# Patient Record
Sex: FEMALE | Race: WHITE | NOT HISPANIC OR LATINO | URBAN - METROPOLITAN AREA
[De-identification: names, ages, dates, MRNs, and addresses within clinical notes are randomized per-mention and may not be internally consistent; named-entity substitution may affect disease eponyms.]

---

## 2020-01-23 ENCOUNTER — OFFICE VISIT - HEALTHEAST (OUTPATIENT)
Dept: SURGERY | Facility: CLINIC | Age: 55
End: 2020-01-23

## 2020-01-23 DIAGNOSIS — K42.0 UMBILICAL HERNIA WITH OBSTRUCTION, WITHOUT GANGRENE: ICD-10-CM

## 2020-01-28 ENCOUNTER — SURGERY - HEALTHEAST (OUTPATIENT)
Dept: SURGERY | Facility: CLINIC | Age: 55
End: 2020-01-28

## 2020-01-28 ENCOUNTER — ANESTHESIA - HEALTHEAST (OUTPATIENT)
Dept: SURGERY | Facility: CLINIC | Age: 55
End: 2020-01-28

## 2020-01-28 ASSESSMENT — MIFFLIN-ST. JEOR: SCORE: 1379

## 2020-02-11 ENCOUNTER — COMMUNICATION - HEALTHEAST (OUTPATIENT)
Dept: SURGERY | Facility: CLINIC | Age: 55
End: 2020-02-11

## 2020-02-11 ENCOUNTER — OFFICE VISIT - HEALTHEAST (OUTPATIENT)
Dept: SURGERY | Facility: CLINIC | Age: 55
End: 2020-02-11

## 2020-02-11 DIAGNOSIS — Z48.89 POSTOPERATIVE VISIT: ICD-10-CM

## 2021-06-04 VITALS
DIASTOLIC BLOOD PRESSURE: 76 MMHG | OXYGEN SATURATION: 97 % | HEART RATE: 82 BPM | SYSTOLIC BLOOD PRESSURE: 122 MMHG | WEIGHT: 184 LBS | BODY MASS INDEX: 33.65 KG/M2

## 2021-06-04 VITALS — HEIGHT: 62 IN | WEIGHT: 184.25 LBS | BODY MASS INDEX: 33.9 KG/M2

## 2021-06-05 NOTE — ANESTHESIA POSTPROCEDURE EVALUATION
Patient: Kristen Horn  Procedure(s):  HERNIORRHAPHY, VENTRAL, LAPAROSCOPIC WITH MESH  Anesthesia type: general    Patient location: Phase II Recovery  Last vitals:   Vitals Value Taken Time   /69 1/28/2020  2:31 PM   Temp 36.9  C (98.5  F) 1/28/2020  1:52 PM   Pulse 75 1/28/2020  2:34 PM   Resp 16 1/28/2020  2:31 PM   SpO2 96 % 1/28/2020  2:34 PM   Vitals shown include unvalidated device data.  Post vital signs: stable  Level of consciousness: awake and responds to simple questions  Post-anesthesia pain: pain controlled  Post-anesthesia nausea and vomiting: no  Pulmonary: unassisted, return to baseline  Cardiovascular: stable and blood pressure at baseline  Hydration: adequate  Anesthetic events: no    QCDR Measures:  ASA# 11 - Stacey-op Cardiac Arrest: ASA11B - Patient did NOT experience unanticipated cardiac arrest  ASA# 12 - Stacey-op Mortality Rate: ASA12B - Patient did NOT die  ASA# 13 - PACU Re-Intubation Rate: ASA13B - Patient did NOT require a new airway mgmt  ASA# 10 - Composite Anes Safety: ASA10A - No serious adverse event    Additional Notes:

## 2021-06-05 NOTE — ANESTHESIA PREPROCEDURE EVALUATION
Anesthesia Evaluation      Patient summary reviewed   No history of anesthetic complications     Airway   Mallampati: II  Neck ROM: full   Pulmonary - negative ROS and normal exam    breath sounds clear to auscultation  (-) sleep apnea, not a smoker                         Cardiovascular - normal exam  Exercise tolerance: > or = 4 METS  (+) hypertension, CAD, dysrhythmias, CHF, cardiomyopathy,     ECG reviewed  Rhythm: regular  Rate: normal,      ROS comment: PHTN     Neuro/Psych    (+) CVA ,     Endo/Other    (+) obesity,   (-) not pregnant     GI/Hepatic/Renal      Comments: Incarcerated [facility]          Dental - normal exam                        Anesthesia Plan  Planned anesthetic: general endotracheal    ASA 3   Induction: intravenous   Anesthetic plan and risks discussed with: patient  Anesthesia plan special considerations: antiemetics,   Post-op plan: routine recovery

## 2021-06-05 NOTE — ANESTHESIA CARE TRANSFER NOTE
Last vitals:   Vitals:    01/28/20 1322   BP: 144/76   Pulse: 81   Resp: 10   Temp: 36.3  C (97.4  F)   SpO2: 98%     Patient's level of consciousness is drowsy  Spontaneous respirations: yes  Maintains airway independently: yes  Dentition unchanged: yes  Oropharynx: oropharynx clear of all foreign objects    QCDR Measures:  ASA# 20 - Surgical Safety Checklist: WHO surgical safety checklist completed prior to induction    PQRS# 430 - Adult PONV Prevention: 4558F - Pt received => 2 anti-emetic agents (different classes) preop & intraop  ASA# 8 - Peds PONV Prevention: NA - Not pediatric patient, not GA or 2 or more risk factors NOT present  PQRS# 424 - Stacey-op Temp Management: 4559F - At least one body temp DOCUMENTED => 35.5C or 95.9F within required timeframe  PQRS# 426 - PACU Transfer Protocol: - Transfer of care checklist used  ASA# 14 - Acute Post-op Pain: ASA14B - Patient did NOT experience pain >= 7 out of 10

## 2021-06-05 NOTE — PROGRESS NOTES
HPI:  Kristen Horn is a 54 y.o. female who was referred to me by Elias Conrad MD for evaluation of an umbilical hernia.  She was recently hospitalized 2 days ago with an incarcerated umbilical hernia; this was successfully reduced at the bedside and given the resolution of her symptoms she was discharged home with plan for follow-up as an outpatient.    Her surgical history is notable for a laparoscopic cholecystectomy.  She has had this umbilical hernia for some time, a number of years, with only occasional pain but never had any obstructive symptoms until earlier this week.  Since she left the hospital, she has had persistent pain but no further obstructive symptoms.    Allergies:Perflutren lipid microspheres    Her medical history is notable for coronary artery disease requiring CABG in 2016, hypertension, history of previous stroke, as well as atrial fibrillation.    Surgical history notable for laparoscopic cholecystectomy    CURRENT MEDS:  Current Outpatient Medications   Medication Sig Dispense Refill     acetaminophen (TYLENOL) 325 MG tablet Take 650 mg by mouth every 6 (six) hours as needed for pain.       albuterol (PROAIR HFA;PROVENTIL HFA;VENTOLIN HFA) 90 mcg/actuation inhaler Inhale 2 puffs every 6 (six) hours as needed for wheezing or shortness of breath.       aspirin 81 MG EC tablet Take 81 mg by mouth daily.       atorvastatin (LIPITOR) 80 MG tablet Take 80 mg by mouth at bedtime.       carvediloL (COREG) 25 MG tablet Take 25 mg by mouth 2 (two) times a day with meals.       losartan (COZAAR) 25 MG tablet Take 25 mg by mouth daily.       spironolactone (ALDACTONE) 25 MG tablet Take 12.5 mg by mouth daily.       torsemide (DEMADEX) 20 MG tablet Take 20 mg by mouth daily.       No current facility-administered medications for this visit.        History reviewed. No pertinent family history.     reports that she has never smoked. She has never used smokeless tobacco. She reports current alcohol  use. She reports that she does not use drugs.    Review of Systems -   The 10 point review of systems  is within normal limits except for as mentioned above in the HPI.  General ROS: No complaints or constitutional symptoms  Skin: No complaints or symptoms   Hematologic/Lymphatic: No symptoms or complaints  Psychiatric: No symptoms or complaints  Endocrine: No excessive fatigue, no hypermetabolic symptoms reported  Respiratory ROS: no cough, shortness of breath, or wheezing  Cardiovascular ROS: no chest pain or dyspnea on exertion  Gastrointestinal ROS: As per HPI  Musculoskeletal ROS: no recent injuries reported  Neurological ROS: no focal neurologic defects reported.      EXAM:  General : Alert, cooperative, appears stated age   Skin: Skin color, texture, turgor normal, no rashes or lesions   Lymphatic: No obvious adenopathy, no swelling   Eyes: No scleral icterus, pupils equal  HENT: no traumatic injury to the head or face, no gross abnormalities  Lungs: Normal respiratory effort, breath sounds equal bilaterally  Heart: Regular rate and rhythm  Abdomen: Soft, nondistended.  Umbilical hernia, difficult to discern fascial defect but feels in the range of 3 cm.  Musculoskeletal: No obvious swelling,  Neurologic: Grossly intact    IMAGES: Outside CT imaging notable for buccal hernia containing portion of colon, no size of the hernia reported on the CT report.  Working on getting the actual imaging sent over to us.    Assessment/Plan:    Kristen Horn is a 54 y.o. female with an umbilical hernia with recent obstruction.  The pathophysiology of umbilical hernias was discussed as were the surgical and non-operative management strategies.      We discussed both open and laparoscopic approaches, and the respective risks and benefits of each approach.  We similarly discussed the role and specific risks associated with mesh placement and primary repair.  The risks of surgery in general were discussed which include, but are  not limited to, bleeding, infection, recurrent hernia, chronic pain, poor cosmesis, blood clots, stroke, heart attack and death.  Additionally, the risks of observation were discussed which include, but are not limited to, enlargement of the hernia, incarceration, strangulation, pain and death.      She understands everything which was discussed and has consented to proceed with a laparoscopic umbilical hernia repair with mesh.  Given her symptomatic nature, will try to repair this soon as possible.    Leonard Mclain MD  [phone]  [facility] Department of Surgery

## 2021-06-06 NOTE — PROGRESS NOTES
HPI: Pt is here for follow up of a laparoscopic ventral hernia repair by Dr Mclain on January 28, 2020.   she is doing well.  Pain is well controlled.  No difficulties with the surgical wound/wounds.  she is eating well and denies fever and chills.         /76 (Patient Site: Right Arm, Patient Position: Sitting, Cuff Size: Adult Regular)   Pulse 82   Wt 184 lb (83.5 kg)   SpO2 97%   BMI 33.65 kg/m      EXAM:  GENERAL:Appears well  ABDOMEN:  Soft, +BS  SURGICAL WOUNDS:  Incisions healing well, no enduration or drainage.        Assessment/Plan: . Doing well after surgery and should follow up as needed.  Blanca Cadena PA-C  [facility] Department of Surgery

## 2021-06-16 PROBLEM — K43.6 INCARCERATED VENTRAL HERNIA: Status: ACTIVE | Noted: 2020-01-21

## 2021-06-16 PROBLEM — K42.0 UMBILICAL HERNIA WITH OBSTRUCTION, WITHOUT GANGRENE: Status: ACTIVE | Noted: 2020-01-23

## 2021-06-16 PROBLEM — I48.0 PAROXYSMAL ATRIAL FIBRILLATION (H): Status: ACTIVE | Noted: 2018-08-20

## 2021-06-20 NOTE — LETTER
Letter by Blanca Cadena PA-C at      Author: Blanca Cadena PA-C Service: -- Author Type: --    Filed:  Encounter Date: 2/11/2020 Status: (Other)         February 11, 2020     Patient: Kristen Horn   YOB: 1965   Date of Visit: 2/11/2020       To Whom It May Concern:    It is my medical opinion that Kristen Horn may return to work on 2/17/2020 without any restrictions.    If you have any questions or concerns, please don't hesitate to call.    Sincerely,        Electronically signed by Blanca Cadena PA-C